# Patient Record
Sex: FEMALE | Race: ASIAN | NOT HISPANIC OR LATINO | URBAN - METROPOLITAN AREA
[De-identification: names, ages, dates, MRNs, and addresses within clinical notes are randomized per-mention and may not be internally consistent; named-entity substitution may affect disease eponyms.]

---
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
## 2022-02-12 VITALS
HEART RATE: 70 BPM | WEIGHT: 94 LBS | TEMPERATURE: 97.7 F | DIASTOLIC BLOOD PRESSURE: 68 MMHG | BODY MASS INDEX: 18.46 KG/M2 | HEIGHT: 60 IN | SYSTOLIC BLOOD PRESSURE: 92 MMHG

## 2022-02-16 NOTE — PROGRESS NOTES
Patient:   NA LI            MRN: [id number]            FIN: [id number]               Age:   17 years     Sex:  Female     :  2000   Associated Diagnoses:   GERD (gastroesophageal reflux disease)   Author:   Julian Hester MD      Visit Information      Date of Service: 10/12/2017 09:00 am  Performing Location: [facility]  Encounter#: [id number]      Primary Care Provider (PCP):  NONE ,       Referring Provider:  Julian Hester MD    NPI# [id number]      Chief Complaint   10/12/2017 9:13 AM CDT   Digestion issues since last Thursday.        History of Present Illness   Having a hard time digesting for a week. Food goes down. Having diarrhea about once  a day. Feels a need to burp but does not help. No vomiting. No dysphagia. Eats alot of spicy food. Feels stomach is upset.  Nonsmoker. Minimal alcohol. Some coffee not everyday. Some soda not everyday. No NSAIDs.  No camping or travel. Went home to [place] for the summer.  Feeling increased stress in past month. 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      Review of Systems   Constitutional:  No fever.    Respiratory:  No shortness of breath.    Integumentary:  No rash.    Menses monthly. Protected intercourse 2 weeks ago      Health Status   Allergies:    Allergic Reactions (Selected)  No Known Medication Allergies   Medications:  (Selected)      Problem list:    No problem items selected or recorded.      Histories   Past Medical History: No hospilatizations or surgeries   Social History: Exchange student from [place]      Physical Examination   Vital Signs   10/12/2017 9:13 AM CDT Temperature Tympanic 97.7 DegF  LOW    Peripheral Pulse Rate 70 bpm    Systolic Blood Pressure 92 mmHg    Diastolic Blood Pressure 68 mmHg      General:  Alert and oriented, No acute distress.    Neck:  No lymphadenopathy.    Respiratory:  Lungs are clear to auscultation.    Cardiovascular:  Normal rate, Regular rhythm.    Gastrointestinal:  Soft, Non-tender,  Non-distended.    Musculoskeletal:  Normal gait.    Psychiatric:  Appropriate mood & affect.       Review / Management   Doubt pregnancy. Offered UPT and patient declined      Impression and Plan   Diagnosis     GERD (gastroesophageal reflux disease) ([id number] K21.9).     Discussed GERD diet and role of stress. Patient desires omeprazole for one week